# Patient Record
Sex: FEMALE | Race: OTHER | HISPANIC OR LATINO
[De-identification: names, ages, dates, MRNs, and addresses within clinical notes are randomized per-mention and may not be internally consistent; named-entity substitution may affect disease eponyms.]

---

## 2019-03-18 PROBLEM — Z00.00 ENCOUNTER FOR PREVENTIVE HEALTH EXAMINATION: Status: ACTIVE | Noted: 2019-03-18

## 2019-04-09 ENCOUNTER — APPOINTMENT (OUTPATIENT)
Dept: ORTHOPEDIC SURGERY | Facility: CLINIC | Age: 39
End: 2019-04-09
Payer: COMMERCIAL

## 2019-04-09 VITALS — BODY MASS INDEX: 24.8 KG/M2 | HEIGHT: 63 IN | WEIGHT: 140 LBS

## 2019-04-09 PROCEDURE — 99203 OFFICE O/P NEW LOW 30 MIN: CPT

## 2019-05-08 ENCOUNTER — APPOINTMENT (OUTPATIENT)
Age: 39
End: 2019-05-08
Payer: COMMERCIAL

## 2019-05-08 VITALS — HEART RATE: 77 BPM | SYSTOLIC BLOOD PRESSURE: 102 MMHG | DIASTOLIC BLOOD PRESSURE: 67 MMHG

## 2019-05-08 PROCEDURE — 99213 OFFICE O/P EST LOW 20 MIN: CPT

## 2019-05-08 NOTE — HISTORY OF PRESENT ILLNESS
[FreeTextEntry1] : Patient several years ago underwent excision of a soft tissue mass over the left proximal thigh and the stage for the last several years the patient noted gradually allowed with rare occurrence of the underlying process suggestive the lipomatous mass.

## 2019-05-08 NOTE — PHYSICAL EXAM
[FreeTextEntry1] : Physical exam reveals a healthy-looking the patient in no apparent distress patient appear to be fully alert oriented having no complaints examination of the left eye demonstrate a previously healed surgical scar over the anterolateral aspect of the proximal thigh with allowed him to localize deeply seated nicely just to be a possible lipomatous mass. No gross neurovascular deficits at this stage patient was recommended to proceed with exploration resection soft tissue mass left proximal thigh

## 2019-05-22 ENCOUNTER — OUTPATIENT (OUTPATIENT)
Dept: OUTPATIENT SERVICES | Facility: HOSPITAL | Age: 39
LOS: 1 days | End: 2019-05-22
Payer: COMMERCIAL

## 2019-05-22 VITALS
HEIGHT: 62 IN | HEART RATE: 92 BPM | TEMPERATURE: 98 F | RESPIRATION RATE: 20 BRPM | WEIGHT: 145.95 LBS | SYSTOLIC BLOOD PRESSURE: 142 MMHG | DIASTOLIC BLOOD PRESSURE: 82 MMHG

## 2019-05-22 DIAGNOSIS — R07.9 CHEST PAIN, UNSPECIFIED: ICD-10-CM

## 2019-05-22 DIAGNOSIS — Z98.890 OTHER SPECIFIED POSTPROCEDURAL STATES: Chronic | ICD-10-CM

## 2019-05-22 DIAGNOSIS — M79.9 SOFT TISSUE DISORDER, UNSPECIFIED: ICD-10-CM

## 2019-05-22 DIAGNOSIS — R22.9 LOCALIZED SWELLING, MASS AND LUMP, UNSPECIFIED: ICD-10-CM

## 2019-05-22 DIAGNOSIS — Z98.890 OTHER SPECIFIED POSTPROCEDURAL STATES: ICD-10-CM

## 2019-05-22 LAB
ANION GAP SERPL CALC-SCNC: 12 MMO/L — SIGNIFICANT CHANGE UP (ref 7–14)
BLD GP AB SCN SERPL QL: NEGATIVE — SIGNIFICANT CHANGE UP
BUN SERPL-MCNC: 13 MG/DL — SIGNIFICANT CHANGE UP (ref 7–23)
CALCIUM SERPL-MCNC: 10.1 MG/DL — SIGNIFICANT CHANGE UP (ref 8.4–10.5)
CHLORIDE SERPL-SCNC: 101 MMOL/L — SIGNIFICANT CHANGE UP (ref 98–107)
CO2 SERPL-SCNC: 26 MMOL/L — SIGNIFICANT CHANGE UP (ref 22–31)
CREAT SERPL-MCNC: 0.85 MG/DL — SIGNIFICANT CHANGE UP (ref 0.5–1.3)
GLUCOSE SERPL-MCNC: 69 MG/DL — LOW (ref 70–99)
HCG SERPL-ACNC: < 5 MIU/ML — SIGNIFICANT CHANGE UP
HCT VFR BLD CALC: 39.5 % — SIGNIFICANT CHANGE UP (ref 34.5–45)
HGB BLD-MCNC: 13.1 G/DL — SIGNIFICANT CHANGE UP (ref 11.5–15.5)
MCHC RBC-ENTMCNC: 33.2 % — SIGNIFICANT CHANGE UP (ref 32–36)
MCHC RBC-ENTMCNC: 33.6 PG — SIGNIFICANT CHANGE UP (ref 27–34)
MCV RBC AUTO: 101.3 FL — HIGH (ref 80–100)
NRBC # FLD: 0 K/UL — SIGNIFICANT CHANGE UP (ref 0–0)
PLATELET # BLD AUTO: 222 K/UL — SIGNIFICANT CHANGE UP (ref 150–400)
PMV BLD: 11.3 FL — SIGNIFICANT CHANGE UP (ref 7–13)
POTASSIUM SERPL-MCNC: 3.5 MMOL/L — SIGNIFICANT CHANGE UP (ref 3.5–5.3)
POTASSIUM SERPL-SCNC: 3.5 MMOL/L — SIGNIFICANT CHANGE UP (ref 3.5–5.3)
RBC # BLD: 3.9 M/UL — SIGNIFICANT CHANGE UP (ref 3.8–5.2)
RBC # FLD: 11.9 % — SIGNIFICANT CHANGE UP (ref 10.3–14.5)
RH IG SCN BLD-IMP: POSITIVE — SIGNIFICANT CHANGE UP
SODIUM SERPL-SCNC: 139 MMOL/L — SIGNIFICANT CHANGE UP (ref 135–145)
WBC # BLD: 7.83 K/UL — SIGNIFICANT CHANGE UP (ref 3.8–10.5)
WBC # FLD AUTO: 7.83 K/UL — SIGNIFICANT CHANGE UP (ref 3.8–10.5)

## 2019-05-22 PROCEDURE — 93010 ELECTROCARDIOGRAM REPORT: CPT

## 2019-05-22 RX ORDER — SODIUM CHLORIDE 9 MG/ML
1000 INJECTION, SOLUTION INTRAVENOUS
Refills: 0 | Status: DISCONTINUED | OUTPATIENT
Start: 2019-05-29 | End: 2019-06-13

## 2019-05-22 NOTE — H&P PST ADULT - NSANTHOSAYNRD_GEN_A_CORE
No. LIANE screening performed.  STOP BANG Legend: 0-2 = LOW Risk; 3-4 = INTERMEDIATE Risk; 5-8 = HIGH Risk

## 2019-05-22 NOTE — H&P PST ADULT - HISTORY OF PRESENT ILLNESS
Pt is Pt is an 39 y.o. female ; pt is Kazakh Speaking ; information obtained via [employer]  # [id number] Lenard . Pt s/p Excision Soft Tissue Mass Left Thigh 2018 in [facility] . Pt reports reocurrance ; pt to surgeon ; pt now presents for Exploration Resection Soft Tissue Mass left Thigh Pt is an 39 y.o. female ; pt is Persian Speaking ; information obtained via [employer]  # [id number] Lenard . Pt s/p Excision Soft Tissue Mass Left Thigh 2018 in [facility] . Pt reports reocurrance ; pt to surgeon ; pt now presents for Exploration Resection Soft Tissue Mass left Thigh Pt is an 39 y.o. female ; pt is Azeri Speaking ; information obtained via [employer]  # [id number] Lenard . Pt s/p Excision Soft Tissue Mass Left Thigh 2018 in [facility] . Pt reports reoccurrence ; pt to surgeon ; pt now presents for Exploration Resection Soft Tissue Mass left Thigh    Pt is a poor historian

## 2019-05-22 NOTE — H&P PST ADULT - NSICDXPROBLEM_GEN_ALL_CORE_FT
PROBLEM DIAGNOSES  Problem: Localized swelling, mass, or lump  Assessment and Plan: Exploration Resection Soft Tissue Mass left Thigh  Pre op instructions including Pepcid and Hibiclens given to pt ;via [employer]  Lenard # [id number]; pt appears to have a good understanding of pre op instructions   Cup provided for UCG dos    Problem: Chest pain  Assessment and Plan: Pt reports h/o cp last 1 month ago    Pt denies cp, palpitations, sob at present   Pt in nad vss  Pt to Dr Vu for pre op evaluation ; ekg faxed Katrina to review with Dr Vu  Call to Dr Vitaliy zhou ; s/w Ariel ; to make surgeon aware regarding need pre op evaluation r/t cp  Pt instructed any c/o cp, palpitations, sob ; go to ER PROBLEM DIAGNOSES  Problem: Localized swelling, mass, or lump  Assessment and Plan: Exploration Resection Soft Tissue Mass left Thigh  Pre op instructions including Pepcid and Hibiclens given to pt ;via [employer]  Lenard # [id number]; pt appears to have a good understanding of pre op instructions   Cup provided for UCG dos    Problem: Chest pain  Assessment and Plan: Pt reports h/o cp last 1 month ago    Pt denies cp, palpitations, sob at present   Pt in nad vss  Pt to Dr Vu for pre op evaluation ; ekg faxed Katrina to review with Dr Vu  Call to Dr Vitaliy zhou ; s/w Ariel ; to make surgeon aware regarding need pre op evaluation r/t cp  Pt instructed any c/o cp, palpitations, sob ; go to ER    Problem: H/O neck surgery  Assessment and Plan: pt unclear regarding surgery ; pt denies recent studies

## 2019-05-22 NOTE — H&P PST ADULT - NSICDXPASTMEDICALHX_GEN_ALL_CORE_FT
PAST MEDICAL HISTORY:  Anxiety PAST MEDICAL HISTORY:  Anxiety     H/O neck surgery pt unclear  regarding exact surgery

## 2019-05-22 NOTE — H&P PST ADULT - SYMPTOMS
pt reports cp ; last occurring 1 month ago pt reports cp ; last occurring 1 month ago pt denies cp, palpitations sob at present

## 2019-05-22 NOTE — H&P PST ADULT - NSICDXPASTSURGICALHX_GEN_ALL_CORE_FT
PAST SURGICAL HISTORY:  H/O excision of mass left thigh    H/O neck surgery [facility] "I PAST SURGICAL HISTORY:  H/O excision of mass left thigh    H/O neck surgery [facility] "

## 2019-05-28 ENCOUNTER — TRANSCRIPTION ENCOUNTER (OUTPATIENT)
Age: 39
End: 2019-05-28

## 2019-05-29 ENCOUNTER — RESULT REVIEW (OUTPATIENT)
Age: 39
End: 2019-05-29

## 2019-05-29 ENCOUNTER — OUTPATIENT (OUTPATIENT)
Dept: OUTPATIENT SERVICES | Facility: HOSPITAL | Age: 39
LOS: 1 days | Discharge: HOME | End: 2019-05-29
Payer: COMMERCIAL

## 2019-05-29 ENCOUNTER — APPOINTMENT (OUTPATIENT)
Dept: ORTHOPEDIC SURGERY | Facility: HOSPITAL | Age: 39
End: 2019-05-29

## 2019-05-29 VITALS
DIASTOLIC BLOOD PRESSURE: 79 MMHG | HEIGHT: 62 IN | SYSTOLIC BLOOD PRESSURE: 128 MMHG | WEIGHT: 145.95 LBS | TEMPERATURE: 99 F | RESPIRATION RATE: 16 BRPM | HEART RATE: 93 BPM | OXYGEN SATURATION: 98 %

## 2019-05-29 VITALS
SYSTOLIC BLOOD PRESSURE: 105 MMHG | DIASTOLIC BLOOD PRESSURE: 75 MMHG | HEART RATE: 66 BPM | RESPIRATION RATE: 15 BRPM | OXYGEN SATURATION: 98 %

## 2019-05-29 DIAGNOSIS — M79.9 SOFT TISSUE DISORDER, UNSPECIFIED: ICD-10-CM

## 2019-05-29 DIAGNOSIS — Z98.890 OTHER SPECIFIED POSTPROCEDURAL STATES: Chronic | ICD-10-CM

## 2019-05-29 LAB
HCG UR QL: NEGATIVE — SIGNIFICANT CHANGE UP
RH IG SCN BLD-IMP: POSITIVE — SIGNIFICANT CHANGE UP

## 2019-05-29 PROCEDURE — 27339 EXC THIGH/KNEE TUM DEP 5CM/>: CPT | Mod: LT

## 2019-05-29 PROCEDURE — 88307 TISSUE EXAM BY PATHOLOGIST: CPT | Mod: 26

## 2019-05-29 RX ORDER — FENTANYL CITRATE 50 UG/ML
25 INJECTION INTRAVENOUS
Refills: 0 | Status: DISCONTINUED | OUTPATIENT
Start: 2019-05-29 | End: 2019-05-29

## 2019-05-29 RX ORDER — OXYCODONE HYDROCHLORIDE 5 MG/1
5 TABLET ORAL ONCE
Refills: 0 | Status: DISCONTINUED | OUTPATIENT
Start: 2019-05-29 | End: 2019-05-29

## 2019-05-29 RX ORDER — ONDANSETRON 8 MG/1
4 TABLET, FILM COATED ORAL ONCE
Refills: 0 | Status: DISCONTINUED | OUTPATIENT
Start: 2019-05-29 | End: 2019-06-13

## 2019-05-29 RX ORDER — SENNOSIDES/DOCUSATE SODIUM 8.6MG-50MG
1 TABLET ORAL
Qty: 5 | Refills: 0
Start: 2019-05-29 | End: 2019-06-02

## 2019-05-29 RX ORDER — SODIUM CHLORIDE 9 MG/ML
1000 INJECTION, SOLUTION INTRAVENOUS
Refills: 0 | Status: DISCONTINUED | OUTPATIENT
Start: 2019-05-29 | End: 2019-06-13

## 2019-05-29 RX ADMIN — OXYCODONE HYDROCHLORIDE 5 MILLIGRAM(S): 5 TABLET ORAL at 22:50

## 2019-05-29 RX ADMIN — OXYCODONE HYDROCHLORIDE 5 MILLIGRAM(S): 5 TABLET ORAL at 23:15

## 2019-05-29 NOTE — ASU DISCHARGE PLAN (ADULT/PEDIATRIC) - CALL YOUR DOCTOR IF YOU HAVE ANY OF THE FOLLOWING:
Nausea and vomiting that does not stop/Numbness, tingling, color or temperature change to extremity/Bleeding that does not stop/Pain not relieved by Medications/Swelling that gets worse/Wound/Surgical Site with redness, or foul smelling discharge or pus

## 2019-05-29 NOTE — ASU DISCHARGE PLAN (ADULT/PEDIATRIC) - ASU DC SPECIAL INSTRUCTIONSFT
Please follow up with Dr. Moreno within 1-2 weeks. You may call [phone] to schedule an appointment.    You may resume a regular diet and regular activities. Please do not participate in heavy lifting or strenuous exercise. Do not drive while taking pain medication.    Please go to the emergency department if you experience pain not controlled by pain medication, bleeding that will not stop, fevers or chills.

## 2019-05-29 NOTE — ASU DISCHARGE PLAN (ADULT/PEDIATRIC) - CARE PROVIDER_API CALL
Mike Moreno)  Orthopedics  [address], [address]  [place] [address]  Phone: [phone]  Fax: [phone]  Follow Up Time: 1 week

## 2019-06-12 ENCOUNTER — APPOINTMENT (OUTPATIENT)
Dept: ORTHOPEDIC SURGERY | Facility: CLINIC | Age: 39
End: 2019-06-12
Payer: COMMERCIAL

## 2019-06-12 DIAGNOSIS — M79.9 SOFT TISSUE DISORDER, UNSPECIFIED: ICD-10-CM

## 2019-06-12 PROCEDURE — 99024 POSTOP FOLLOW-UP VISIT: CPT

## 2019-06-13 PROBLEM — F41.9 ANXIETY DISORDER, UNSPECIFIED: Chronic | Status: ACTIVE | Noted: 2019-05-22

## 2019-06-13 PROBLEM — Z98.890 OTHER SPECIFIED POSTPROCEDURAL STATES: Chronic | Status: ACTIVE | Noted: 2019-05-22

## 2019-06-13 NOTE — PHYSICAL EXAM
[FreeTextEntry1] : Physical exam reveals a healthy-looking patient in no apparent distress patient appeared to be fully alert and oriented examination of the left thigh demonstrate and subcutaneous and the procedure soft tissue mass proximal thigh suggest a possible lipomatous mass and dysphagia patient was recommended to obtain an MRI scan of the left proximal thigh and to be seen again for followup the initial surgical procedure would be discussed with the patient

## 2019-06-13 NOTE — HISTORY OF PRESENT ILLNESS
[de-identified] : Presented 2 weeks following exploration resection soft tissue mass from the left proximal thigh [de-identified] : On examination today the surgical wound healed nice stitches were removed no swelling no skin hematoma no discharge patient having local dysesthesia surrounding the surgical scar related to the origins of the lateral femoral cutaneous nerve.. Pathology is not finalized yet. if the underlying condition would prove malignant patient wiuld recommend to have further wider surgical procedure to obtain adequate surgical margin. At this stage the patient will be followed and will be seen again once the pathology report will be available.

## 2019-06-13 NOTE — HISTORY OF PRESENT ILLNESS
[FreeTextEntry1] : This is the first visit of a 39 years old female presented for evaluation of a soft tissue mass over the proximal left thigh. Several years ago patient underwent surgical procedure back home mass was removed suggested a benign process possible a lipoma at this stage patient presenting with local recurrence.

## 2019-06-19 ENCOUNTER — APPOINTMENT (OUTPATIENT)
Dept: ORTHOPEDIC SURGERY | Facility: CLINIC | Age: 39
End: 2019-06-19
Payer: COMMERCIAL

## 2019-06-19 VITALS
BODY MASS INDEX: 24.8 KG/M2 | DIASTOLIC BLOOD PRESSURE: 70 MMHG | HEIGHT: 63 IN | HEART RATE: 80 BPM | WEIGHT: 140 LBS | SYSTOLIC BLOOD PRESSURE: 105 MMHG

## 2019-06-19 PROCEDURE — 99024 POSTOP FOLLOW-UP VISIT: CPT

## 2019-06-20 NOTE — HISTORY OF PRESENT ILLNESS
[de-identified] : Patient was seen today in followup 3 weeks post exploration wide localized resection soft tissue mass from the left proximal thigh. 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 Surgical wound he denies no swelling no palpable mass. And his condition again was discussed with the patient was recommended to proceed with a second surgical procedure to attempt to obtain wide there is surgical margin the nature of possible future possibility of radiation therapy would not be ruled out . Arrangements are being made for the patient to undergo the surgical procedure patient was recommended to obtain MRI scan of the left proximal thigh and to be booked for surgical procedure . The nature of the underlying condition and risk of future local recurrence and the need for further future surgical procedures not be ruled out [de-identified] : On examination today surgical Wound heal nice no localized pain. Patient was recommended to proceed with a second wider surgical procedure in attempt to obtain adequate surgical margin

## 2019-06-25 LAB — SURGICAL PATHOLOGY STUDY: SIGNIFICANT CHANGE UP

## 2019-06-26 ENCOUNTER — OUTPATIENT (OUTPATIENT)
Dept: OUTPATIENT SERVICES | Facility: HOSPITAL | Age: 39
LOS: 1 days | End: 2019-06-26

## 2019-06-26 VITALS
TEMPERATURE: 98 F | SYSTOLIC BLOOD PRESSURE: 126 MMHG | WEIGHT: 147.05 LBS | DIASTOLIC BLOOD PRESSURE: 74 MMHG | OXYGEN SATURATION: 99 % | HEART RATE: 82 BPM | HEIGHT: 61.5 IN | RESPIRATION RATE: 16 BRPM

## 2019-06-26 DIAGNOSIS — D17.24 BENIGN LIPOMATOUS NEOPLASM OF SKIN AND SUBCUTANEOUS TISSUE OF LEFT LEG: ICD-10-CM

## 2019-06-26 DIAGNOSIS — Z98.890 OTHER SPECIFIED POSTPROCEDURAL STATES: Chronic | ICD-10-CM

## 2019-06-26 DIAGNOSIS — R22.42 LOCALIZED SWELLING, MASS AND LUMP, LEFT LOWER LIMB: Chronic | ICD-10-CM

## 2019-06-26 LAB
HCG SERPL-ACNC: < 5 MIU/ML — SIGNIFICANT CHANGE UP
HCT VFR BLD CALC: 39.8 % — SIGNIFICANT CHANGE UP (ref 34.5–45)
HGB BLD-MCNC: 12.9 G/DL — SIGNIFICANT CHANGE UP (ref 11.5–15.5)
MCHC RBC-ENTMCNC: 32.4 % — SIGNIFICANT CHANGE UP (ref 32–36)
MCHC RBC-ENTMCNC: 32.9 PG — SIGNIFICANT CHANGE UP (ref 27–34)
MCV RBC AUTO: 101.5 FL — HIGH (ref 80–100)
NRBC # FLD: 0 K/UL — SIGNIFICANT CHANGE UP (ref 0–0)
PLATELET # BLD AUTO: 218 K/UL — SIGNIFICANT CHANGE UP (ref 150–400)
PMV BLD: 11.6 FL — SIGNIFICANT CHANGE UP (ref 7–13)
RBC # BLD: 3.92 M/UL — SIGNIFICANT CHANGE UP (ref 3.8–5.2)
RBC # FLD: 11.9 % — SIGNIFICANT CHANGE UP (ref 10.3–14.5)
WBC # BLD: 7.73 K/UL — SIGNIFICANT CHANGE UP (ref 3.8–10.5)
WBC # FLD AUTO: 7.73 K/UL — SIGNIFICANT CHANGE UP (ref 3.8–10.5)

## 2019-06-26 NOTE — H&P PST ADULT - NEGATIVE ENMT SYMPTOMS
no sinus symptoms/no nasal obstruction/no post-nasal discharge/no nose bleeds/no tinnitus/no hearing difficulty/no vertigo/no nasal congestion/no gum bleeding/no nasal discharge/no recurrent cold sores/no dry mouth/no ear pain

## 2019-06-26 NOTE — H&P PST ADULT - NSICDXPROBLEM_GEN_ALL_CORE_FT
PROBLEM DIAGNOSES  Problem: Benign lipomatous neoplasm of skin and subcutaneous tissue of left leg  Assessment and Plan: PROBLEM DIAGNOSES  Problem: Benign lipomatous neoplasm of skin and subcutaneous tissue of left leg  Assessment and Plan: Scheduled for wide resection soft tissue mass left proximal thigh mass on 06/28/19. Pre op instructions, famotidine, chlorhexidine gluconate soap given, written instructions provided and explained. Pt verbalized understanding.

## 2019-06-26 NOTE — H&P PST ADULT - NEGATIVE GASTROINTESTINAL SYMPTOMS
no vomiting/no nausea/no melena/no jaundice/no diarrhea/no change in bowel habits/no hiccoughs/no constipation

## 2019-06-26 NOTE — H&P PST ADULT - NEGATIVE NEUROLOGICAL SYMPTOMS
no transient paralysis/no paresthesias/no focal seizures/no loss of consciousness/no facial palsy/no tremors/no headache/no vertigo/no loss of sensation/no difficulty walking/no confusion/no weakness/no generalized seizures

## 2019-06-26 NOTE — H&P PST ADULT - HISTORY OF PRESENT ILLNESS
38 y/o  female non English speaking H/o left thigh mass x 1 1/2 hour. had a tumor remove 20 days ago stated that MD needs to go back in. 40 y/o  female non English speaking presents to PST with hx of left thigh mass x 1 1/2 hour. S/P Exploration resection mass left thigh 05/2019. Pt stated that MD needs to go back in. Now scheduled for wide resection soft tissue mass left proximal thigh mass on 06/28/19

## 2019-06-26 NOTE — H&P PST ADULT - RS GEN PE MLT RESP DETAILS PC
airway patent/clear to auscultation bilaterally/no chest wall tenderness/respirations non-labored/no rhonchi/no subcutaneous emphysema/no intercostal retractions/no rales/no wheezes/good air movement/breath sounds equal

## 2019-06-26 NOTE — H&P PST ADULT - NEGATIVE BREAST SYMPTOMS
no breast tenderness R/no breast tenderness L/no breast lump R/no nipple discharge R/no nipple discharge L/no breast lump L

## 2019-06-26 NOTE — H&P PST ADULT - MUSCULOSKELETAL
details… No joint pain, swelling or deformity; no limitation of movement detailed exam no joint erythema/no joint warmth/no calf tenderness

## 2019-06-26 NOTE — H&P PST ADULT - NEGATIVE GENERAL GENITOURINARY SYMPTOMS
no renal colic/no dysuria/no hematuria/no flank pain R/no gas in urine/no flank pain L/normal urinary frequency/no urine discoloration/no bladder infections

## 2019-06-26 NOTE — H&P PST ADULT - NEGATIVE OPHTHALMOLOGIC SYMPTOMS
no loss of vision L/no scleral injection L/no blurred vision L/no irritation L/no loss of vision R/no irritation R/no discharge R/no pain L/no lacrimation L/no lacrimation R/no discharge L/no blurred vision R/no diplopia/no photophobia

## 2019-06-26 NOTE — H&P PST ADULT - NSICDXPASTSURGICALHX_GEN_ALL_CORE_FT
PAST SURGICAL HISTORY:  H/O excision of mass left thigh    H/O neck surgery [facility] " PAST SURGICAL HISTORY:  H/O excision of mass left thigh x2 ; (~ 3 years ago & 2018)    H/O neck surgery [facility] " - denies h/o neck surgery    History of shoulder surgery left - 2 years ago    Mass of thigh, left S/P Exploration resection left thigh mass ; 05/2019

## 2019-06-26 NOTE — H&P PST ADULT - ASSESSMENT
40 y/o female presents with pre op diagnosis: Benign lipomatous neoplasm of skin and subcutaneous tissue of left leg

## 2019-06-27 ENCOUNTER — TRANSCRIPTION ENCOUNTER (OUTPATIENT)
Age: 39
End: 2019-06-27

## 2019-06-28 ENCOUNTER — INPATIENT (INPATIENT)
Facility: HOSPITAL | Age: 39
LOS: 4 days | Discharge: HOME | End: 2019-07-03
Attending: ORTHOPAEDIC SURGERY | Admitting: ORTHOPAEDIC SURGERY
Payer: COMMERCIAL

## 2019-06-28 ENCOUNTER — APPOINTMENT (OUTPATIENT)
Dept: ORTHOPEDIC SURGERY | Facility: HOSPITAL | Age: 39
End: 2019-06-28

## 2019-06-28 ENCOUNTER — RESULT REVIEW (OUTPATIENT)
Age: 39
End: 2019-06-28

## 2019-06-28 VITALS
WEIGHT: 147.05 LBS | TEMPERATURE: 98 F | HEART RATE: 74 BPM | HEIGHT: 61.5 IN | DIASTOLIC BLOOD PRESSURE: 83 MMHG | RESPIRATION RATE: 16 BRPM | SYSTOLIC BLOOD PRESSURE: 119 MMHG | OXYGEN SATURATION: 99 %

## 2019-06-28 DIAGNOSIS — Z98.890 OTHER SPECIFIED POSTPROCEDURAL STATES: Chronic | ICD-10-CM

## 2019-06-28 DIAGNOSIS — R22.42 LOCALIZED SWELLING, MASS AND LUMP, LEFT LOWER LIMB: Chronic | ICD-10-CM

## 2019-06-28 DIAGNOSIS — D17.24 BENIGN LIPOMATOUS NEOPLASM OF SKIN AND SUBCUTANEOUS TISSUE OF LEFT LEG: ICD-10-CM

## 2019-06-28 LAB — HCG UR QL: NEGATIVE — SIGNIFICANT CHANGE UP

## 2019-06-28 PROCEDURE — 27364 RESECT THIGH/KNEE TUM 5 CM/>: CPT | Mod: 58,LT

## 2019-06-28 PROCEDURE — 88307 TISSUE EXAM BY PATHOLOGIST: CPT | Mod: 26

## 2019-06-28 PROCEDURE — 88305 TISSUE EXAM BY PATHOLOGIST: CPT | Mod: 26

## 2019-06-28 RX ORDER — DIPHENHYDRAMINE HCL 50 MG
25 CAPSULE ORAL EVERY 4 HOURS
Refills: 0 | Status: DISCONTINUED | OUTPATIENT
Start: 2019-06-28 | End: 2019-07-03

## 2019-06-28 RX ORDER — CEFAZOLIN SODIUM 1 G
2000 VIAL (EA) INJECTION EVERY 8 HOURS
Refills: 0 | Status: COMPLETED | OUTPATIENT
Start: 2019-06-28 | End: 2019-06-29

## 2019-06-28 RX ORDER — LANOLIN ALCOHOL/MO/W.PET/CERES
3 CREAM (GRAM) TOPICAL AT BEDTIME
Refills: 0 | Status: DISCONTINUED | OUTPATIENT
Start: 2019-06-28 | End: 2019-07-03

## 2019-06-28 RX ORDER — DOCUSATE SODIUM 100 MG
100 CAPSULE ORAL THREE TIMES A DAY
Refills: 0 | Status: DISCONTINUED | OUTPATIENT
Start: 2019-06-28 | End: 2019-07-03

## 2019-06-28 RX ORDER — MAGNESIUM HYDROXIDE 400 MG/1
30 TABLET, CHEWABLE ORAL DAILY
Refills: 0 | Status: DISCONTINUED | OUTPATIENT
Start: 2019-06-28 | End: 2019-07-03

## 2019-06-28 RX ORDER — ACETAMINOPHEN 500 MG
650 TABLET ORAL EVERY 6 HOURS
Refills: 0 | Status: DISCONTINUED | OUTPATIENT
Start: 2019-06-28 | End: 2019-07-03

## 2019-06-28 RX ORDER — FENTANYL CITRATE 50 UG/ML
25 INJECTION INTRAVENOUS
Refills: 0 | Status: DISCONTINUED | OUTPATIENT
Start: 2019-06-28 | End: 2019-06-28

## 2019-06-28 RX ORDER — OXYCODONE HYDROCHLORIDE 5 MG/1
10 TABLET ORAL EVERY 4 HOURS
Refills: 0 | Status: DISCONTINUED | OUTPATIENT
Start: 2019-06-28 | End: 2019-07-03

## 2019-06-28 RX ORDER — SODIUM CHLORIDE 9 MG/ML
1000 INJECTION INTRAMUSCULAR; INTRAVENOUS; SUBCUTANEOUS
Refills: 0 | Status: DISCONTINUED | OUTPATIENT
Start: 2019-06-28 | End: 2019-07-03

## 2019-06-28 RX ORDER — HYDROMORPHONE HYDROCHLORIDE 2 MG/ML
0.5 INJECTION INTRAMUSCULAR; INTRAVENOUS; SUBCUTANEOUS
Refills: 0 | Status: DISCONTINUED | OUTPATIENT
Start: 2019-06-28 | End: 2019-07-03

## 2019-06-28 RX ORDER — BENZOCAINE AND MENTHOL 5; 1 G/100ML; G/100ML
1 LIQUID ORAL
Refills: 0 | Status: DISCONTINUED | OUTPATIENT
Start: 2019-06-28 | End: 2019-06-28

## 2019-06-28 RX ORDER — OXYCODONE HYDROCHLORIDE 5 MG/1
5 TABLET ORAL EVERY 4 HOURS
Refills: 0 | Status: DISCONTINUED | OUTPATIENT
Start: 2019-06-28 | End: 2019-07-03

## 2019-06-28 RX ORDER — SODIUM CHLORIDE 9 MG/ML
1000 INJECTION, SOLUTION INTRAVENOUS
Refills: 0 | Status: DISCONTINUED | OUTPATIENT
Start: 2019-06-28 | End: 2019-06-28

## 2019-06-28 RX ORDER — ONDANSETRON 8 MG/1
4 TABLET, FILM COATED ORAL EVERY 6 HOURS
Refills: 0 | Status: DISCONTINUED | OUTPATIENT
Start: 2019-06-28 | End: 2019-07-03

## 2019-06-28 RX ORDER — ONDANSETRON 8 MG/1
4 TABLET, FILM COATED ORAL ONCE
Refills: 0 | Status: DISCONTINUED | OUTPATIENT
Start: 2019-06-28 | End: 2019-06-28

## 2019-06-28 RX ORDER — BENZOCAINE AND MENTHOL 5; 1 G/100ML; G/100ML
1 LIQUID ORAL
Refills: 0 | Status: DISCONTINUED | OUTPATIENT
Start: 2019-06-28 | End: 2019-07-03

## 2019-06-28 RX ADMIN — OXYCODONE HYDROCHLORIDE 5 MILLIGRAM(S): 5 TABLET ORAL at 20:35

## 2019-06-28 RX ADMIN — OXYCODONE HYDROCHLORIDE 5 MILLIGRAM(S): 5 TABLET ORAL at 20:05

## 2019-06-28 NOTE — PROGRESS NOTE ADULT - SUBJECTIVE AND OBJECTIVE BOX
Ortho Post-Op    Patient was seen and examined at bedside. Denies CP/SOB/Dizziness/HA/N/V. Pain is controlled.     Vital Signs Last 24 Hrs  T(C): 36.6 (28 Jun 2019 19:00), Max: 36.7 (28 Jun 2019 13:19)  T(F): 97.8 (28 Jun 2019 19:00), Max: 98.1 (28 Jun 2019 13:19)  HR: 68 (28 Jun 2019 20:00) (66 - 88)  BP: 107/72 (28 Jun 2019 20:00) (99/59 - 121/68)  BP(mean): 79 (28 Jun 2019 20:00) (78 - 81)  RR: 15 (28 Jun 2019 20:00) (12 - 19)  SpO2: 99% (28 Jun 2019 20:00) (96% - 99%)    GEN: NAD  LLE: Ace dressing C/D/I.  ALFONSO, HV in place   Bilat LE: Motor intact + EHL/FHL/TA/GS. Sensation is grossly intact distal . Extremity warm. Compartments are soft. DP 1+            A/P: Patient is a 39y y/o Female s/p left thigh radical resection, POD #0     -Pain control/analgesia  -Inc spirometry  -Venodynes/foot pumps  -F/U AM Labs  -PT/OT/WBAT  -Antibiotic periop-ancef  -Continue to monitor. Notify ortho with any questions.

## 2019-06-28 NOTE — BRIEF OPERATIVE NOTE - COMMENTS
WBAT with crutches  vac - DC on monday 7/1  maintain ALFONSO drain, to be removed in office  pain control  scd, early ambulation

## 2019-06-29 LAB
ANION GAP SERPL CALC-SCNC: 17 MMO/L — HIGH (ref 7–14)
BUN SERPL-MCNC: 11 MG/DL — SIGNIFICANT CHANGE UP (ref 7–23)
CALCIUM SERPL-MCNC: 9.3 MG/DL — SIGNIFICANT CHANGE UP (ref 8.4–10.5)
CHLORIDE SERPL-SCNC: 102 MMOL/L — SIGNIFICANT CHANGE UP (ref 98–107)
CO2 SERPL-SCNC: 19 MMOL/L — LOW (ref 22–31)
CREAT SERPL-MCNC: 0.63 MG/DL — SIGNIFICANT CHANGE UP (ref 0.5–1.3)
GLUCOSE SERPL-MCNC: 110 MG/DL — HIGH (ref 70–99)
HCT VFR BLD CALC: 38.2 % — SIGNIFICANT CHANGE UP (ref 34.5–45)
HGB BLD-MCNC: 12.5 G/DL — SIGNIFICANT CHANGE UP (ref 11.5–15.5)
MCHC RBC-ENTMCNC: 32.7 % — SIGNIFICANT CHANGE UP (ref 32–36)
MCHC RBC-ENTMCNC: 33.6 PG — SIGNIFICANT CHANGE UP (ref 27–34)
MCV RBC AUTO: 102.7 FL — HIGH (ref 80–100)
NRBC # FLD: 0 K/UL — SIGNIFICANT CHANGE UP (ref 0–0)
PLATELET # BLD AUTO: 128 K/UL — LOW (ref 150–400)
PMV BLD: 12.2 FL — SIGNIFICANT CHANGE UP (ref 7–13)
POTASSIUM SERPL-MCNC: 4.6 MMOL/L — SIGNIFICANT CHANGE UP (ref 3.5–5.3)
POTASSIUM SERPL-SCNC: 4.6 MMOL/L — SIGNIFICANT CHANGE UP (ref 3.5–5.3)
RBC # BLD: 3.72 M/UL — LOW (ref 3.8–5.2)
RBC # FLD: 12 % — SIGNIFICANT CHANGE UP (ref 10.3–14.5)
SODIUM SERPL-SCNC: 138 MMOL/L — SIGNIFICANT CHANGE UP (ref 135–145)
WBC # BLD: 12.79 K/UL — HIGH (ref 3.8–10.5)
WBC # FLD AUTO: 12.79 K/UL — HIGH (ref 3.8–10.5)

## 2019-06-29 RX ORDER — GABAPENTIN 400 MG/1
100 CAPSULE ORAL EVERY 8 HOURS
Refills: 0 | Status: DISCONTINUED | OUTPATIENT
Start: 2019-06-29 | End: 2019-07-03

## 2019-06-29 RX ADMIN — GABAPENTIN 100 MILLIGRAM(S): 400 CAPSULE ORAL at 21:54

## 2019-06-29 RX ADMIN — OXYCODONE HYDROCHLORIDE 5 MILLIGRAM(S): 5 TABLET ORAL at 06:51

## 2019-06-29 RX ADMIN — OXYCODONE HYDROCHLORIDE 5 MILLIGRAM(S): 5 TABLET ORAL at 21:54

## 2019-06-29 RX ADMIN — OXYCODONE HYDROCHLORIDE 5 MILLIGRAM(S): 5 TABLET ORAL at 14:03

## 2019-06-29 RX ADMIN — Medication 100 MILLIGRAM(S): at 06:51

## 2019-06-29 RX ADMIN — Medication 100 MILLIGRAM(S): at 21:55

## 2019-06-29 RX ADMIN — Medication 100 MILLIGRAM(S): at 08:06

## 2019-06-29 RX ADMIN — Medication 100 MILLIGRAM(S): at 00:12

## 2019-06-29 RX ADMIN — Medication 100 MILLIGRAM(S): at 14:03

## 2019-06-29 RX ADMIN — OXYCODONE HYDROCHLORIDE 5 MILLIGRAM(S): 5 TABLET ORAL at 22:50

## 2019-06-29 RX ADMIN — OXYCODONE HYDROCHLORIDE 5 MILLIGRAM(S): 5 TABLET ORAL at 07:21

## 2019-06-29 RX ADMIN — OXYCODONE HYDROCHLORIDE 5 MILLIGRAM(S): 5 TABLET ORAL at 14:30

## 2019-06-29 NOTE — PROGRESS NOTE ADULT - SUBJECTIVE AND OBJECTIVE BOX
Plastic Surgery Progress Note    S: Patient seen and examined.  POD1 from R thigh mass excision.     Vital Signs Last 24 Hrs  T(C): 36.7 (29 Jun 2019 06:46), Max: 36.8 (29 Jun 2019 01:48)  T(F): 98 (29 Jun 2019 06:46), Max: 98.3 (29 Jun 2019 01:48)  HR: 72 (29 Jun 2019 06:46) (66 - 88)  BP: 96/54 (29 Jun 2019 06:46) (96/54 - 121/68)  BP(mean): 79 (28 Jun 2019 20:00) (78 - 81)  RR: 16 (29 Jun 2019 06:46) (12 - 19)  SpO2: 100% (29 Jun 2019 06:46) (96% - 100%)  I&O's Detail    28 Jun 2019 07:01  -  29 Jun 2019 07:00  --------------------------------------------------------  IN:    sodium chloride 0.9%.: 225 mL  Total IN: 225 mL    OUT:    Drain: 80 mL    Voided: 800 mL  Total OUT: 880 mL    Total NET: -655 mL          Physical Exam:  General: Awake and alert, NAD  R leg: ace wrap in place, clean, drain SS, vac holding suction

## 2019-06-29 NOTE — PROGRESS NOTE ADULT - ASSESSMENT
39F POD1 from R thigh mass    -continue ace wrap and dressing  -continue vac    discussed with attending    [id number] POD1 from R thigh mass    -continue ace wrap and dressing  -continue incisional vac -plan to dc vac monday    discussed with attending    [id number]

## 2019-06-29 NOTE — PHYSICAL THERAPY INITIAL EVALUATION ADULT - ACTIVE RANGE OF MOTION EXAMINATION, REHAB EVAL
left hip flexion 0-90, knee not assess, ankle WFL/bilateral upper extremity Active ROM was WFL (within functional limits)/Right LE Active ROM was WFL (within functional limits)

## 2019-06-29 NOTE — PROGRESS NOTE ADULT - SUBJECTIVE AND OBJECTIVE BOX
Orthopedic Surgery Progress Note  S: Pain is well controlled. No events overnight.    O:  Vital Signs Last 24 Hrs  T(C): 36.7 (29 Jun 2019 06:46), Max: 36.8 (29 Jun 2019 01:48)  T(F): 98 (29 Jun 2019 06:46), Max: 98.3 (29 Jun 2019 01:48)  HR: 72 (29 Jun 2019 06:46) (66 - 88)  BP: 96/54 (29 Jun 2019 06:46) (96/54 - 121/68)  BP(mean): 79 (28 Jun 2019 20:00) (78 - 81)  RR: 16 (29 Jun 2019 06:46) (12 - 19)  SpO2: 100% (29 Jun 2019 06:46) (96% - 100%)    Gen: NAD  LLE  Dressing clean, dry, intact  ALFONSO with SS output  EHL/FHL/TA/GS intact  SILT DP/SP/Seay/Sa  WWP distally                  12.5   12.79 )-----------( 128      ( 29 Jun 2019 07:31 )             38.2     A/P 39y year old Female POD1 L thigh mass resection    Pain Control  WBAT  FU ALFONSO output  PT/OOB  DVT PPx  Dispo Planning    Lanre Burgess MD

## 2019-06-30 LAB
ANION GAP SERPL CALC-SCNC: 10 MMO/L — SIGNIFICANT CHANGE UP (ref 7–14)
BUN SERPL-MCNC: 9 MG/DL — SIGNIFICANT CHANGE UP (ref 7–23)
CALCIUM SERPL-MCNC: 9.5 MG/DL — SIGNIFICANT CHANGE UP (ref 8.4–10.5)
CHLORIDE SERPL-SCNC: 105 MMOL/L — SIGNIFICANT CHANGE UP (ref 98–107)
CK MB BLD-MCNC: 2.53 NG/ML — SIGNIFICANT CHANGE UP (ref 1–4.7)
CK SERPL-CCNC: 625 U/L — HIGH (ref 25–170)
CO2 SERPL-SCNC: 26 MMOL/L — SIGNIFICANT CHANGE UP (ref 22–31)
CREAT SERPL-MCNC: 0.78 MG/DL — SIGNIFICANT CHANGE UP (ref 0.5–1.3)
GLUCOSE SERPL-MCNC: 87 MG/DL — SIGNIFICANT CHANGE UP (ref 70–99)
HCT VFR BLD CALC: 35.9 % — SIGNIFICANT CHANGE UP (ref 34.5–45)
HGB BLD-MCNC: 11.7 G/DL — SIGNIFICANT CHANGE UP (ref 11.5–15.5)
MCHC RBC-ENTMCNC: 32.6 % — SIGNIFICANT CHANGE UP (ref 32–36)
MCHC RBC-ENTMCNC: 33.2 PG — SIGNIFICANT CHANGE UP (ref 27–34)
MCV RBC AUTO: 102 FL — HIGH (ref 80–100)
NRBC # FLD: 0 K/UL — SIGNIFICANT CHANGE UP (ref 0–0)
PLATELET # BLD AUTO: 183 K/UL — SIGNIFICANT CHANGE UP (ref 150–400)
PMV BLD: 11 FL — SIGNIFICANT CHANGE UP (ref 7–13)
POTASSIUM SERPL-MCNC: 3.5 MMOL/L — SIGNIFICANT CHANGE UP (ref 3.5–5.3)
POTASSIUM SERPL-SCNC: 3.5 MMOL/L — SIGNIFICANT CHANGE UP (ref 3.5–5.3)
RBC # BLD: 3.52 M/UL — LOW (ref 3.8–5.2)
RBC # FLD: 12.4 % — SIGNIFICANT CHANGE UP (ref 10.3–14.5)
SODIUM SERPL-SCNC: 141 MMOL/L — SIGNIFICANT CHANGE UP (ref 135–145)
TROPONIN T, HIGH SENSITIVITY: < 6 NG/L — SIGNIFICANT CHANGE UP (ref ?–14)
WBC # BLD: 8.88 K/UL — SIGNIFICANT CHANGE UP (ref 3.8–10.5)
WBC # FLD AUTO: 8.88 K/UL — SIGNIFICANT CHANGE UP (ref 3.8–10.5)

## 2019-06-30 PROCEDURE — 93010 ELECTROCARDIOGRAM REPORT: CPT

## 2019-06-30 PROCEDURE — 71045 X-RAY EXAM CHEST 1 VIEW: CPT | Mod: 26

## 2019-06-30 RX ADMIN — OXYCODONE HYDROCHLORIDE 10 MILLIGRAM(S): 5 TABLET ORAL at 22:02

## 2019-06-30 RX ADMIN — OXYCODONE HYDROCHLORIDE 10 MILLIGRAM(S): 5 TABLET ORAL at 21:17

## 2019-06-30 RX ADMIN — GABAPENTIN 100 MILLIGRAM(S): 400 CAPSULE ORAL at 05:51

## 2019-06-30 RX ADMIN — GABAPENTIN 100 MILLIGRAM(S): 400 CAPSULE ORAL at 21:18

## 2019-06-30 RX ADMIN — GABAPENTIN 100 MILLIGRAM(S): 400 CAPSULE ORAL at 13:46

## 2019-06-30 NOTE — PROVIDER CONTACT NOTE (OTHER) - SITUATION
Pt reporting new onset chest tightness and muscular tenderness to Left chest , neck and shoulder. States this started at sunrise

## 2019-06-30 NOTE — PROGRESS NOTE ADULT - SUBJECTIVE AND OBJECTIVE BOX
Plastic Surgery Progress Note    S: Patient seen and examined. CP this am, primary team sending Umang, EKG    Vital Signs Last 24 Hrs  T(C): 36.8 (30 Jun 2019 05:49), Max: 37.2 (29 Jun 2019 13:58)  T(F): 98.3 (30 Jun 2019 05:49), Max: 98.9 (29 Jun 2019 13:58)  HR: 78 (30 Jun 2019 05:49) (76 - 105)  BP: 99/68 (30 Jun 2019 05:49) (92/48 - 106/66)  BP(mean): --  RR: 17 (30 Jun 2019 05:49) (16 - 17)  SpO2: 99% (30 Jun 2019 05:49) (97% - 100%)  I&O's Detail    29 Jun 2019 07:01  -  30 Jun 2019 07:00  --------------------------------------------------------  IN:  Total IN: 0 mL    OUT:    Drain: 95 mL  Total OUT: 95 mL    Total NET: -95 mL          Physical Exam:  General: Awake and alert, NAD  RLE: dressing CDI, drain 95 and SS, vac in place holding suction

## 2019-06-30 NOTE — PROGRESS NOTE ADULT - ASSESSMENT
39F POD2 from R thigh mass excision    -continue ace wrap and dressing  -continue drain  -continue incisional vac -plan to dc vac monday      [id number]

## 2019-06-30 NOTE — PROVIDER CONTACT NOTE (OTHER) - RECOMMENDATIONS
MD Diaz at bedside and assessing patient - consider cardiac workup to rule out any myocardial issues

## 2019-06-30 NOTE — PROGRESS NOTE ADULT - SUBJECTIVE AND OBJECTIVE BOX
Orthopaedic Surgery Progress Note    Subjective:   Patient seen and examined  No acute events overnight    Objective:  T(C): 36.8 (06-29-19 @ 21:36), Max: 37.2 (06-29-19 @ 13:58)  HR: 87 (06-29-19 @ 21:36) (72 - 105)  BP: 106/62 (06-29-19 @ 21:36) (93/52 - 106/66)  RR: 16 (06-29-19 @ 21:36) (16 - 17)  SpO2: 100% (06-29-19 @ 21:36) (97% - 100%)  Wt(kg): --    06-28 @ 07:01  -  06-29 @ 07:00  --------------------------------------------------------  IN: 225 mL / OUT: 880 mL / NET: -655 mL    06-29 @ 07:01  -  06-30 @ 00:43  --------------------------------------------------------  IN: 0 mL / OUT: 65 mL / NET: -65 mL    PE    Gen: NAD  LLE  Dressing clean, dry, intact  ALFONSO with SS output  EHL/FHL/TA/GS intact  SILT DP/SP/Seay/Sa  WWP distally                          12.5   12.79 )-----------( 128      ( 29 Jun 2019 07:31 )             38.2     06-29    138  |  102  |  11  ----------------------------<  110<H>  4.6   |  19<L>  |  0.63    Ca    9.3      29 Jun 2019 07:31    A/P 39y year old Female POD2 from L thigh mass resection    Pain Control  WBAT  FU ALFONSO output  PT/OOB  DVT PPx  Dispo Planning Orthopaedic Surgery Progress Note    Subjective:   Patient seen and examined  left sternal chest pain with some bilateral trapezius (low neck) pain this morning    Objective:  T(C): 36.8 (06-29-19 @ 21:36), Max: 37.2 (06-29-19 @ 13:58)  HR: 87 (06-29-19 @ 21:36) (72 - 105)  BP: 106/62 (06-29-19 @ 21:36) (93/52 - 106/66)  RR: 16 (06-29-19 @ 21:36) (16 - 17)  SpO2: 100% (06-29-19 @ 21:36) (97% - 100%)  Wt(kg): --    06-28 @ 07:01  -  06-29 @ 07:00  --------------------------------------------------------  IN: 225 mL / OUT: 880 mL / NET: -655 mL    06-29 @ 07:01  -  06-30 @ 00:43  --------------------------------------------------------  IN: 0 mL / OUT: 65 mL / NET: -65 mL    PE    Gen: NAD  LLE  Dressing clean, dry, intact  ALFONSO with SS output  EHL/FHL/TA/GS intact  SILT DP/SP/Seay/Sa  WWP distally                          12.5   12.79 )-----------( 128      ( 29 Jun 2019 07:31 )             38.2     06-29    138  |  102  |  11  ----------------------------<  110<H>  4.6   |  19<L>  |  0.63    Ca    9.3      29 Jun 2019 07:31    A/P 39y year old Female POD2 from L thigh mass resection  FU EKG, CXR, cardiac enzymes for atypical chest pain  Pain Control  WBAT  FU ALFONSO output  PT/OOB  DVT PPx  Dispo Planning

## 2019-06-30 NOTE — PROVIDER CONTACT NOTE (OTHER) - ASSESSMENT
Pt denies SOB or jaw pain. C/o chest tightness and muscular tenderness to left chest, shoulder and neck. No acute changes to VS. No changes in LOC, dizziness or perspiration.

## 2019-07-01 ENCOUNTER — TRANSCRIPTION ENCOUNTER (OUTPATIENT)
Age: 39
End: 2019-07-01

## 2019-07-01 RX ADMIN — GABAPENTIN 100 MILLIGRAM(S): 400 CAPSULE ORAL at 06:14

## 2019-07-01 RX ADMIN — GABAPENTIN 100 MILLIGRAM(S): 400 CAPSULE ORAL at 13:27

## 2019-07-01 NOTE — DISCHARGE NOTE NURSING/CASE MANAGEMENT/SOCIAL WORK - NSDCPNINST_GEN_ALL_CORE
Notify MD if any redness/ swelling/ or purulent drainage around the surgical sites, keep dressings dry and intact , empty ALFONSO bulb as needed. Notify MD if any fevers.

## 2019-07-01 NOTE — PROGRESS NOTE ADULT - SUBJECTIVE AND OBJECTIVE BOX
Stable comfortable out of bed to a chair, VAC was removed . ALFONSO drain in place, patient was recommended to stay in hospital as long as the drain in site, possible few days.

## 2019-07-01 NOTE — PROGRESS NOTE ADULT - SUBJECTIVE AND OBJECTIVE BOX
Plastic Surgery    SUBJECTIVE: Seen and examined in AM w/ team.  NAEON. Doing well     VITALS  T(C): 36.3 (07-01-19 @ 05:48), Max: 37.6 (06-30-19 @ 13:54)  HR: 70 (07-01-19 @ 05:48) (70 - 90)  BP: 98/58 (07-01-19 @ 05:48) (97/55 - 111/70)  RR: 18 (07-01-19 @ 05:48) (18 - 18)  SpO2: 100% (07-01-19 @ 05:48) (98% - 100%)  CAPILLARY BLOOD GLUCOSE          Is/Os    06-30 @ 07:01  -  07-01 @ 07:00  --------------------------------------------------------  IN:  Total IN: 0 mL    OUT:    Drain: 55 mL  Total OUT: 55 mL    Total NET: -55 mL          PHYSICAL EXAM:   General: NAD, Lying in bed comfortably  Neuro: Awake and Alert   Extremity:  L leg: Incisional Vac taken down. Incision c/d/i. No signs of fluid collections.  ALFONSO in place w/ SS output    MEDICATIONS (STANDING): docusate sodium 100 milliGRAM(s) Oral three times a day  gabapentin 100 milliGRAM(s) Oral every 8 hours  sodium chloride 0.9%. 1000 milliLiter(s) IV Continuous <Continuous>    MEDICATIONS (PRN):acetaminophen   Tablet .. 650 milliGRAM(s) Oral every 6 hours PRN Temp greater or equal to 38C (100.4F), Mild Pain (1 - 3)  aluminum hydroxide/magnesium hydroxide/simethicone Suspension 30 milliLiter(s) Oral four times a day PRN Indigestion  diphenhydrAMINE 25 milliGRAM(s) Oral every 4 hours PRN Rash and/or Itching  HYDROmorphone  Injectable 0.5 milliGRAM(s) IV Push every 3 hours PRN breakthrough pain  magnesium hydroxide Suspension 30 milliLiter(s) Oral daily PRN Constipation  melatonin 3 milliGRAM(s) Oral at bedtime PRN Insomnia  ondansetron Injectable 4 milliGRAM(s) IV Push every 6 hours PRN Nausea and/or Vomiting  oxyCODONE    IR 10 milliGRAM(s) Oral every 4 hours PRN Severe Pain (7 - 10)  oxyCODONE    IR 5 milliGRAM(s) Oral every 4 hours PRN Moderate Pain (4 - 6)      LABS  CBC (06-30 @ 06:58)                              11.7                           8.88    )----------------(  183        --    % Neutrophils, --    % Lymphocytes, ANC: --                                  35.9      BMP (06-30 @ 06:58)             141     |  105     |  9     		Ca++ --      Ca 9.5                ---------------------------------( 87    		Mg --                 3.5     |  26      |  0.78  			Ph --            Cardiac Markers (06-30 @ 06:58)     HSTrop: < 6 / CKMB: 2.53 / CK: 625          IMAGING STUDIES

## 2019-07-01 NOTE — PROGRESS NOTE ADULT - ASSESSMENT
39F s/p groin washout R thigh mass excision closed  w/ sartorius flap from R thigh on 6/28. Pt w/ chest pain on 6/30 w/ negative CXR, EKG, and trop.       -Incisional vac taken down today  -Wound care w/ 4x4 and then ACE wrap from knee to thigh  -Cont ALFONSO care  - Care and Dispo per primary        [id number]

## 2019-07-01 NOTE — PROGRESS NOTE ADULT - SUBJECTIVE AND OBJECTIVE BOX
Ortho Progress Note  MINA AZAR   MRN-[id number]    39yFemale is s/p elective Left thigh liposarcoma radical resection with PRS closure 6/28/2019 POD#3 w/out any c/o.  Resting comfortably, NAD, ANO x 3    Vital Signs Last 24 Hrs  T(C): 36.7 (01 Jul 2019 09:06), Max: 37.6 (30 Jun 2019 13:54)  T(F): 98.1 (01 Jul 2019 09:06), Max: 99.6 (30 Jun 2019 13:54)  HR: 97 (01 Jul 2019 09:06) (70 - 97)  BP: 113/55 (01 Jul 2019 09:06) (97/55 - 113/55)  BP(mean): --  RR: 18 (01 Jul 2019 09:06) (18 - 18)  SpO2: 100% (01 Jul 2019 09:06) (98% - 100%)  No Known Allergies      LLE  Dressing clean, dry, intact  ALFONSO 22.5cc/12hr shift, 65cc/24hr shift  EHL/FHL/TA/GS intact  SILT to LLE  WWP distally  VAC removed by PRS earlier this am                          11.7   8.88  )-----------( 183      ( 30 Jun 2019 06:58 )             35.9     06-30    141  |  105  |  9   ----------------------------<  87  3.5   |  26  |  0.78    Ca    9.5      30 Jun 2019 06:58          A/P Ortho Stable s/p elective Left thigh liposarcoma radical resection with PRS closure POD#3  continue to monitor HV - as per Dr. Moreno keep drain and monitor til July 3rd  continue venodynes for anticoagulation   continue Physical Therapy BID: WBAT, OOB for gait training  discharge planning when ALFONSO removed

## 2019-07-01 NOTE — DISCHARGE NOTE NURSING/CASE MANAGEMENT/SOCIAL WORK - NSDCDPATPORTLINK_GEN_ALL_CORE
You can access the [facility], offered by [facility], by registering with the following website: [email or web address]

## 2019-07-02 RX ADMIN — OXYCODONE HYDROCHLORIDE 5 MILLIGRAM(S): 5 TABLET ORAL at 22:57

## 2019-07-02 RX ADMIN — GABAPENTIN 100 MILLIGRAM(S): 400 CAPSULE ORAL at 06:24

## 2019-07-02 RX ADMIN — OXYCODONE HYDROCHLORIDE 5 MILLIGRAM(S): 5 TABLET ORAL at 22:12

## 2019-07-02 RX ADMIN — GABAPENTIN 100 MILLIGRAM(S): 400 CAPSULE ORAL at 14:31

## 2019-07-02 RX ADMIN — GABAPENTIN 100 MILLIGRAM(S): 400 CAPSULE ORAL at 22:13

## 2019-07-02 RX ADMIN — Medication 650 MILLIGRAM(S): at 15:10

## 2019-07-02 RX ADMIN — Medication 100 MILLIGRAM(S): at 14:31

## 2019-07-02 RX ADMIN — Medication 650 MILLIGRAM(S): at 14:34

## 2019-07-02 RX ADMIN — MAGNESIUM HYDROXIDE 30 MILLILITER(S): 400 TABLET, CHEWABLE ORAL at 14:32

## 2019-07-02 NOTE — PROGRESS NOTE ADULT - SUBJECTIVE AND OBJECTIVE BOX
Patient is seen and examined. Denies CP/SOB/DIzziness/N/V/D/HA. Pain is controlled. Translation obtained.    Vital Signs Last 24 Hrs  T(C): 36.8 (01 Jul 2019 20:51), Max: 36.9 (01 Jul 2019 13:07)  T(F): 98.2 (01 Jul 2019 20:51), Max: 98.4 (01 Jul 2019 13:07)  HR: 70 (01 Jul 2019 20:51) (70 - 97)  BP: 101/56 (01 Jul 2019 20:51) (99/68 - 113/55)  BP(mean): --  RR: 17 (01 Jul 2019 20:51) (17 - 18)  SpO2: 99% (01 Jul 2019 20:51) (99% - 100%)    Gen: NAD    LLE: Dressing C/D/I. ALFONSO Drain is in place.   Motor intact LLE. Sensation is grossly intact in the LLE. Compartments are soft, extremities are warm.  Labs:                        11.7   8.88  )-----------( 183      ( 30 Jun 2019 06:58 )             35.9       06-30    141  |  105  |  9   ----------------------------<  87  3.5   |  26  |  0.78    Ca    9.5      30 Jun 2019 06:58        A/P: Patient is a 39y Female s/p Left thigh radical resection with PRS closure POD # 4     - Pain control / Analgesia  - Monitor ALFONSO output  - PT/OT  - WBAT  - OOB  - Inc Spirometry  - Venodynes  - F/U AM Labs  - Notify Ortho with any questions

## 2019-07-03 ENCOUNTER — TRANSCRIPTION ENCOUNTER (OUTPATIENT)
Age: 39
End: 2019-07-03

## 2019-07-03 VITALS
RESPIRATION RATE: 18 BRPM | SYSTOLIC BLOOD PRESSURE: 101 MMHG | TEMPERATURE: 98 F | OXYGEN SATURATION: 98 % | DIASTOLIC BLOOD PRESSURE: 61 MMHG | HEART RATE: 85 BPM

## 2019-07-03 PROCEDURE — 99238 HOSP IP/OBS DSCHRG MGMT 30/<: CPT

## 2019-07-03 RX ORDER — OXYCODONE HYDROCHLORIDE 5 MG/1
1 TABLET ORAL
Qty: 42 | Refills: 0
Start: 2019-07-03 | End: 2019-07-09

## 2019-07-03 RX ORDER — ACETAMINOPHEN 500 MG
2 TABLET ORAL
Qty: 56 | Refills: 0
Start: 2019-07-03 | End: 2019-07-09

## 2019-07-03 RX ORDER — DOCUSATE SODIUM 100 MG
1 CAPSULE ORAL
Qty: 21 | Refills: 0
Start: 2019-07-03 | End: 2019-07-09

## 2019-07-03 RX ORDER — GABAPENTIN 400 MG/1
1 CAPSULE ORAL
Qty: 90 | Refills: 0
Start: 2019-07-03 | End: 2019-08-01

## 2019-07-03 NOTE — DISCHARGE NOTE PROVIDER - HOSPITAL COURSE
38 yo is s/p  above without any intraoperative complications.  Pt is weight bear as tolerated doing well and stable for discharge home. call surgeon's office one week to make appt. D/C sutures/staples POD 14 Follow up with PRS MD Dr Lebron 7/5/19  call for appt [phone] .

## 2019-07-03 NOTE — PROGRESS NOTE ADULT - SUBJECTIVE AND OBJECTIVE BOX
Patient is seen and examined. Denies CP/SOB/DIzziness/N/V/D/HA. Pain is controlled.   ALFONSO drain discontinued by PRS this morning    T(C): 37.1 (07-03-19 @ 06:39), Max: 37.1 (07-03-19 @ 06:39)  HR: 93 (07-03-19 @ 06:39) (73 - 93)  BP: 101/67 (07-03-19 @ 06:39) (99/58 - 104/67)  RR: 18 (07-03-19 @ 06:39) (18 - 18)  SpO2: 98% (07-03-19 @ 06:39) (98% - 100%)  Wt(kg): --      Gen: NAD    LLE: Dressing C/D/I  Motor intact LLE. Sensation is grossly intact in the LLE. Compartments are soft, extremities are warm.        A/P: Patient is a 39y Female s/p Left thigh radical resection with PRS closure POD #5    - Pain control / Analgesia    - PT/OT  - WBAT  - OOB  - Inc Spirometry  - Venodynes  - Dispo planning

## 2019-07-03 NOTE — DISCHARGE NOTE PROVIDER - CARE PROVIDER_API CALL
Mike Moreno)  Orthopedics  [address], [address]  [place] [address]  Phone: [phone]  Fax: [phone]  Follow Up Time:

## 2019-07-03 NOTE — DISCHARGE NOTE PROVIDER - NSDCFUADDINST_GEN_ALL_CORE_FT
resume same diet as prior to surgery   weight bear as tolerated left lower extremity   Dr Moreno 1 week call for appt   Dr Lebron call for appt

## 2019-07-10 ENCOUNTER — APPOINTMENT (OUTPATIENT)
Dept: ORTHOPEDIC SURGERY | Facility: CLINIC | Age: 39
End: 2019-07-10
Payer: COMMERCIAL

## 2019-07-10 LAB — SURGICAL PATHOLOGY STUDY: SIGNIFICANT CHANGE UP

## 2019-07-10 PROCEDURE — 99024 POSTOP FOLLOW-UP VISIT: CPT

## 2019-07-10 NOTE — HISTORY OF PRESENT ILLNESS
[de-identified] : Patient was seen today in followup one week post tomato wide resection of the soft tissue mass from the left proximal thigh with skin closure using a plastic care transposition procedure. The surgical procedure was without any complications [de-identified] : On examination today surgical wound healed denies there is no redness no discharge no swelling pain and dysphagia the patient was recommended to be followed by the plastic surgeon and to be seen by us in 2 weeks for followup

## 2019-07-31 ENCOUNTER — APPOINTMENT (OUTPATIENT)
Dept: ORTHOPEDIC SURGERY | Facility: CLINIC | Age: 39
End: 2019-07-31
Payer: COMMERCIAL

## 2019-07-31 PROCEDURE — 99024 POSTOP FOLLOW-UP VISIT: CPT

## 2019-07-31 NOTE — HISTORY OF PRESENT ILLNESS
[de-identified] : Patient is in today in followup today over 3 weeks posterior make sure wide resection of a soft tissue mass involving the left proximal thigh the surgical procedure was resolved and the significant complication and his surgical wound was closed with localized skin flap surgical procedure he denies and no discharge no swelling. Pathology confirmed a wide resection of the entire mass with them my mind no residual in the center of the specimen surgical margin cleared. She was recommended to be followed and to be seen again in 2 months for followup

## 2019-12-18 ENCOUNTER — APPOINTMENT (OUTPATIENT)
Dept: ORTHOPEDIC SURGERY | Facility: CLINIC | Age: 39
End: 2019-12-18
Payer: COMMERCIAL

## 2019-12-18 DIAGNOSIS — C49.9 MALIGNANT NEOPLASM OF CONNECTIVE AND SOFT TISSUE, UNSPECIFIED: ICD-10-CM

## 2019-12-18 PROCEDURE — 99213 OFFICE O/P EST LOW 20 MIN: CPT

## 2019-12-18 NOTE — HISTORY OF PRESENT ILLNESS
[FreeTextEntry1] : Patient was seen today in followup about one year post mitral wide resection of a soft tissue mass from the anterior aspect of the left proximal thigh he surgically diagnosed as a myxoid liposarcoma the entire mass was removed completely with adequate surgical margin and whether he patient was then to a good level of activity, heavy localized dysesthesia along the anterior thigh

## 2019-12-18 NOTE — PHYSICAL EXAM
[FreeTextEntry1] : Physical exam reveals a healthy-looking patient in no apparent distress patient appeared to be fully alert oriented and in no significant complaint is that of the left side demonstrates healed surgical scar no swelling no palpable mass no gross neurovascular deficit patient having a localized and dysesthesia over the anterior aspect of the thigh otherwise the patient will be followed and will be seen again in 9 months for followup

## 2024-04-14 NOTE — H&P PST ADULT - NEGATIVE FEMALE-SPECIFIC SYMPTOMS
PAST MEDICAL HISTORY:  No pertinent past medical history     
no abnormal vaginal bleeding/no pelvic pain